# Patient Record
Sex: FEMALE | Race: WHITE | NOT HISPANIC OR LATINO | URBAN - METROPOLITAN AREA
[De-identification: names, ages, dates, MRNs, and addresses within clinical notes are randomized per-mention and may not be internally consistent; named-entity substitution may affect disease eponyms.]

---

## 2017-05-05 ENCOUNTER — GENERIC CONVERSION - ENCOUNTER (OUTPATIENT)
Dept: OTHER | Facility: OTHER | Age: 22
End: 2017-05-05

## 2018-01-12 NOTE — MISCELLANEOUS
Message   Date: 05 May 2017 2:55 PM EST, Recorded By: Jb Clark For: Gilmer Dreas: Lew Matthews, [place]   Phone: [phone] (Home)   Reason: Medical Complaint   Patient called c/o herpes outbreak  Requests Rx for Valtrex  Escript sent  Active Problems    1  Contraceptive use (V25 40) (Z30 40)   2  Encounter for annual routine gynecological examination (V72 31) (Z01 419)   3  Herpes simplex virus (HSV) infection (054 9) (B00 9)   4  Denied: History of self breast exam    Current Meds   1  Sprintec 28 0 25-35 MG-MCG Oral Tablet; TAKE 1 TABLET DAILY AS DIRECTED    Requested for: [id number]; Last Rx:[id number] Ordered   2  Xanax TABS (ALPRAZolam); Therapy: (Recorded:[id number]) to Recorded   3  Zoloft 100 MG Oral Tablet (Sertraline HCl); Therapy: (Recorded:[id number]) to Recorded    Allergies    1   Penicillins    Plan  Herpes simplex virus (HSV) infection    · ValACYclovir HCl - 500 MG Oral Tablet (Valtrex); TAKE 1 TABLET TWICE DAILY    Signatures   Electronically signed by : Vidal Brady, ; May  5 2017  2:56PM EST                       (Author)

## 2018-02-17 RX ORDER — NORGESTIMATE AND ETHINYL ESTRADIOL 0.25-0.035
KIT ORAL
Qty: 84 TABLET | Refills: 4 | OUTPATIENT
Start: 2018-02-17

## 2018-03-19 ENCOUNTER — TELEPHONE (OUTPATIENT)
Dept: OBGYN CLINIC | Facility: CLINIC | Age: 23
End: 2018-03-19